# Patient Record
Sex: FEMALE | Race: WHITE | NOT HISPANIC OR LATINO | Employment: FULL TIME | URBAN - NONMETROPOLITAN AREA
[De-identification: names, ages, dates, MRNs, and addresses within clinical notes are randomized per-mention and may not be internally consistent; named-entity substitution may affect disease eponyms.]

---

## 2022-05-19 ENCOUNTER — OFFICE VISIT (OUTPATIENT)
Dept: CARDIOLOGY | Facility: CLINIC | Age: 62
End: 2022-05-19

## 2022-05-19 VITALS
BODY MASS INDEX: 24.95 KG/M2 | DIASTOLIC BLOOD PRESSURE: 74 MMHG | WEIGHT: 135.6 LBS | SYSTOLIC BLOOD PRESSURE: 166 MMHG | HEART RATE: 75 BPM | HEIGHT: 62 IN

## 2022-05-19 DIAGNOSIS — I25.118 CORONARY ARTERY DISEASE OF NATIVE ARTERY OF NATIVE HEART WITH STABLE ANGINA PECTORIS: Primary | ICD-10-CM

## 2022-05-19 DIAGNOSIS — R07.89 CHEST PRESSURE: ICD-10-CM

## 2022-05-19 DIAGNOSIS — R07.9 EXERTIONAL CHEST PAIN: ICD-10-CM

## 2022-05-19 DIAGNOSIS — R06.02 SHORTNESS OF BREATH: ICD-10-CM

## 2022-05-19 DIAGNOSIS — Z87.891 FORMER SMOKER: ICD-10-CM

## 2022-05-19 DIAGNOSIS — I10 PRIMARY HYPERTENSION: ICD-10-CM

## 2022-05-19 DIAGNOSIS — R42 EPISODIC LIGHTHEADEDNESS: ICD-10-CM

## 2022-05-19 DIAGNOSIS — I71.40 AAA (ABDOMINAL AORTIC ANEURYSM) WITHOUT RUPTURE: ICD-10-CM

## 2022-05-19 DIAGNOSIS — E78.00 HYPERCHOLESTEREMIA: ICD-10-CM

## 2022-05-19 DIAGNOSIS — J43.9 PULMONARY EMPHYSEMA, UNSPECIFIED EMPHYSEMA TYPE: ICD-10-CM

## 2022-05-19 DIAGNOSIS — R09.89 BRUIT OF LEFT CAROTID ARTERY: ICD-10-CM

## 2022-05-19 DIAGNOSIS — R00.2 PALPITATIONS: ICD-10-CM

## 2022-05-19 PROBLEM — F17.210 CIGARETTE SMOKER: Status: ACTIVE | Noted: 2022-05-19

## 2022-05-19 PROCEDURE — 99204 OFFICE O/P NEW MOD 45 MIN: CPT | Performed by: NURSE PRACTITIONER

## 2022-05-19 PROCEDURE — 93000 ELECTROCARDIOGRAM COMPLETE: CPT | Performed by: NURSE PRACTITIONER

## 2022-05-19 RX ORDER — IBUPROFEN 800 MG/1
800 TABLET ORAL AS NEEDED
COMMUNITY

## 2022-05-19 RX ORDER — IPRATROPIUM/ALBUTEROL SULFATE 20-100 MCG
1 MIST INHALER (GRAM) INHALATION 4 TIMES DAILY PRN
COMMUNITY

## 2022-05-19 RX ORDER — ATORVASTATIN CALCIUM 20 MG/1
20 TABLET, FILM COATED ORAL DAILY
COMMUNITY

## 2022-05-19 RX ORDER — CHLORAL HYDRATE 500 MG
1000 CAPSULE ORAL
COMMUNITY
End: 2022-11-29 | Stop reason: SINTOL

## 2022-05-19 RX ORDER — SIMETHICONE 80 MG
80 TABLET,CHEWABLE ORAL 3 TIMES DAILY PRN
COMMUNITY

## 2022-05-19 RX ORDER — LISINOPRIL 10 MG/1
10 TABLET ORAL DAILY
Qty: 30 TABLET | Refills: 11 | Status: SHIPPED | OUTPATIENT
Start: 2022-05-19 | End: 2022-11-29 | Stop reason: SINTOL

## 2022-05-19 NOTE — PROGRESS NOTES
Subjective   Aaliyah Luther is a 61 y.o. female seen in the office today for initial cardiac evaluation.  Recently she has had symptoms of chest pain, palpitations, shortness of breath, headaches, and increased urinary output.  Last week while at work she was talking with her supervisor and developed significant midsternal chest pain associated with mild nausea and diaphoresis.  She sat down and rested and symptoms eased.  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  Randomly she experiences pounding type palpitations.  Because of her increased shortness of breath she has recently stopped smoking, about 2 and half weeks ago.  About a year ago she underwent colonoscopy due to GI symptoms which have been persistent.  Recent CT scan of the abdomen was abnormal and according to patient she has been referred to surgeon.  Secondary to cardiac symptoms in the past she underwent Lexiscan stress test and echocardiogram in 2021 with results showing borderline LVH with normal LVEF and no ischemia.  Recent test include: CT scan of chest done 4/12/2022 showed coronary artery calcification compatible with coronary artery disease and calcifications of the ostia of the left subclavian artery; and 4/15/2022 CT of the abdomen with and without contrast infrarenal abdominal aorta shows dilation of 2.5 cm      Chief Complaint   Patient presents with   • Establish Care     Referred per PCP for CAD. Had  Consult with GI earlier this week   • Coronary Artery Disease     Has new diagnosis of COPD   • LABS     Had labs and CT scan of abdomen . Has copy of results today.  Had labs per PCP , I will call for results   • Chest Pain     Has frequent  episodes of  sharp chest pain and SOA    • Hypertension     Reports BP is usually elevated  at most times       Initial cardiac evaluation; chest pain, shortness of breath, palpitations,  lightheadedness        Cardiac History  Past Surgical History:   Procedure Laterality Date   • CARDIOVASCULAR STRESS TEST  2021    [facility]- Dr. Sadler- normal EF, wall motion and perfusion   • [facility], EF55-60%, borderline LVH, trace to mild MR       Current Outpatient Medications   Medication Sig Dispense Refill   • atorvastatin (LIPITOR) 20 MG tablet Take 20 mg by mouth Daily.     • ibuprofen (ADVIL,MOTRIN) 800 MG tablet Take 800 mg by mouth As Needed for Mild Pain .     • ipratropium-albuterol (Combivent Respimat)  MCG/ACT inhaler Inhale 1 puff 4 (Four) Times a Day As Needed for Wheezing.     • metoprolol tartrate (LOPRESSOR) 25 MG tablet Take 25 mg by mouth 2 (Two) Times a Day.     • Omega-3 Fatty Acids (fish oil) 1000 MG capsule capsule Take 1,000 mg by mouth Daily With Breakfast.     • simethicone (MYLICON) 80 MG chewable tablet Chew 80 mg 3 (Three) Times a Day As Needed for Flatulence.     • lisinopril (PRINIVIL,ZESTRIL) 10 MG tablet Take 1 tablet by mouth Daily. 30 tablet 11     No current facility-administered medications for this visit.       Patient has no known allergies.    Past Medical History:   Diagnosis Date   • Aneurysm (HCC)    • Cervical cancer (HCC)    • COPD (chronic obstructive pulmonary disease) (HCC)    • H/O: hysterectomy    • Hyperlipidemia    • Hypertension        Social History     Socioeconomic History   • Marital status:    Tobacco Use   • Smoking status: Former Smoker     Packs/day: 1.00     Years: 42.00     Pack years: 42.00     Quit date: 5/3/2022     Years since quittin.0   • Smokeless tobacco: Never Used   Vaping Use   • Vaping Use: Never used   Substance and Sexual Activity   • Alcohol use: Never   • Drug use: Yes     Types: Marijuana       Family History   Problem Relation Age of Onset   • No Known Problems Mother    • No Known Problems Father    • Diabetes Sister    • Heart attack Sister    •  "Heart attack Brother    • Diabetes Brother    • Heart attack Maternal Grandfather    • Heart attack Paternal Grandfather        Review of Systems   Constitutional: Positive for diaphoresis and fatigue.   Eyes: Negative.    Respiratory: Positive for cough, chest tightness, shortness of breath and wheezing.    Cardiovascular: Positive for chest pain and palpitations. Negative for leg swelling.   Gastrointestinal: Positive for abdominal distention, abdominal pain, constipation, diarrhea and nausea. Negative for blood in stool and vomiting.   Endocrine: Positive for polyuria. Negative for polydipsia and polyphagia.   Genitourinary: Positive for frequency and urgency. Negative for difficulty urinating and dysuria.   Musculoskeletal: Positive for arthralgias. Negative for gait problem.   Skin: Negative.    Allergic/Immunologic: Positive for environmental allergies.   Neurological: Positive for dizziness, light-headedness and headaches. Negative for seizures, syncope, speech difficulty and numbness.   Hematological: Negative for adenopathy. Does not bruise/bleed easily.   Psychiatric/Behavioral: The patient is nervous/anxious.        BP Readings from Last 5 Encounters:   05/19/22 166/74       Wt Readings from Last 5 Encounters:   05/19/22 61.5 kg (135 lb 9.6 oz)          Objective      Labs A1c 5.4, troponin less than 6, TSH 0.547, T4 8.5, T3 uptake 25, free thyroxine 2.1, magnesium 2.2, WBC 7.7, RBC 5.06, hemoglobin 15.5, hematocrit 46.5, platelets 210, vitamin D28.7, total cholesterol 279, triglycerides 89, HDL 56, , glucose 92, BUN 13, creatinine 0.72, sodium 142, potassium 4.2, carbon dioxide 24, calcium 9.3, AST 15, ALT 10, GFR 96    /74 (BP Location: Right arm)   Pulse 75   Ht 157.5 cm (62\")   Wt 61.5 kg (135 lb 9.6 oz)   BMI 24.80 kg/m²     Physical Exam  Vitals and nursing note reviewed.   Constitutional:       Appearance: Normal appearance.   HENT:      Head: Normocephalic.      Nose: Nose normal. "      Mouth/Throat:      Mouth: Mucous membranes are moist.   Eyes:      Pupils: Pupils are equal, round, and reactive to light.   Neck:      Vascular: Carotid bruit (? on left) present.   Cardiovascular:      Rate and Rhythm: Normal rate and regular rhythm.      Pulses: Normal pulses.      Heart sounds: Murmur heard.   Pulmonary:      Effort: Pulmonary effort is normal.      Breath sounds: Wheezing present.   Abdominal:      General: There is distension.      Tenderness: There is abdominal tenderness.   Musculoskeletal:         General: No swelling.   Skin:     General: Skin is warm and dry.      Coloration: Skin is not jaundiced or pale.   Neurological:      Mental Status: She is alert and oriented to person, place, and time.      Gait: Gait normal.   Psychiatric:         Mood and Affect: Mood normal.         Behavior: Behavior normal.           ECG 12 Lead    Date/Time: 5/19/2022 10:34 AM  Performed by: Yoli Gagnon APRN  Authorized by: Yoli Gagnon APRN   Comparison: compared with previous ECG from 2/27/2019  Comparison to previous ECG: Previous EKG Sinus tach, 102 bpm              Assessment & Plan     Diagnoses and all orders for this visit:    1. Coronary artery disease of native artery of native heart with stable angina pectoris (HCC) (Primary)  -     Stress Test With Myocardial Perfusion One Day; Future  -     Adult Transthoracic Echo Complete W/ Cont if Necessary Per Protocol; Future    2. Exertional chest pain  -     Stress Test With Myocardial Perfusion One Day; Future  -     Adult Transthoracic Echo Complete W/ Cont if Necessary Per Protocol; Future    3. Chest pressure  -     Stress Test With Myocardial Perfusion One Day; Future  -     Adult Transthoracic Echo Complete W/ Cont if Necessary Per Protocol; Future    4. Palpitations  -     Stress Test With Myocardial Perfusion One Day; Future  -     Adult Transthoracic Echo Complete W/ Cont if Necessary Per Protocol; Future    5. Bruit of left  carotid artery  -     US Carotid Bilateral; Future    6. Episodic lightheadedness  -     US Carotid Bilateral; Future    7. Primary hypertension  -     ECG 12 Lead  -     lisinopril (PRINIVIL,ZESTRIL) 10 MG tablet; Take 1 tablet by mouth Daily.  Dispense: 30 tablet; Refill: 11    8. Hypercholesteremia    9. Shortness of breath  -     ECG 12 Lead  -     Stress Test With Myocardial Perfusion One Day; Future  -     Adult Transthoracic Echo Complete W/ Cont if Necessary Per Protocol; Future    10. Pulmonary emphysema, unspecified emphysema type (HCC)    11. AAA (abdominal aortic aneurysm) without rupture (HCC)    12. Former smoker      EKG today shows sinus rhythm with possible left atrial enlargement.  Her blood pressure is increased.  Recent labs shows normal kidney function and electrolytes.  Recommend adding lisinopril 10 mg daily to current medications.    Recent labs show significant elevation of LDL being 208.  Currently she is on Lipitor.  If repeat labs do not show significant improvement recommend maximum dose statin therapy and consider addition of PCSK9 inhibitors.  She admits to making significant dietary changes which I encouraged her to continue.    Due to recent diagnosis of emphysema she has stopped smoking which I encouraged her on maintenance of smoking cessation for overall health benefits.    In regards to patient's current symptoms cardiac ischemia needs to be assessed.  A nuclear stress test ordered.  She will try walking treadmill but if unable to tolerate then Lexiscan stress test will be done.  Echocardiogram ordered to look at overall cardiac output, valvular structure, PA pressure.    She admits to episodes of lightheadedness and dizziness.  Possible carotid bruit noted on the left.  Carotid ultrasound ordered to further assess for stenosis as causative factor.    Patient anticipates surgical intervention of GI issues.  She plans to see surgeon in the near future.  Cardiac clearance if needed  can be determined once test results available.    A follow-up visit scheduled.  Please call sooner for cardiac concerns.

## 2022-07-07 ENCOUNTER — APPOINTMENT (OUTPATIENT)
Dept: CARDIOLOGY | Facility: HOSPITAL | Age: 62
End: 2022-07-07

## 2022-08-09 ENCOUNTER — TELEPHONE (OUTPATIENT)
Dept: CARDIOLOGY | Facility: CLINIC | Age: 62
End: 2022-08-09

## 2022-08-09 NOTE — TELEPHONE ENCOUNTER
Patient called had ER visit at [facility] ER  8-7-2022 with diagnosis of dizziness .  ER note, CTA head and EKG  Obtained for review.   She said her employer  has restricted her from work until cleared form Cardiology and PCP .  She has scheduled Stress test and Echo 8-

## 2022-08-10 NOTE — TELEPHONE ENCOUNTER
CTA and EKG were normal. Continue with stress and echo as scheduled. If she continues to have symptoms then await till test results to return to work.

## 2022-08-16 ENCOUNTER — HOSPITAL ENCOUNTER (OUTPATIENT)
Dept: CARDIOLOGY | Facility: HOSPITAL | Age: 62
Discharge: HOME | End: 2022-08-16

## 2022-08-16 VITALS — WEIGHT: 135.58 LBS | BODY MASS INDEX: 24.95 KG/M2 | HEIGHT: 62 IN

## 2022-08-16 DIAGNOSIS — R07.89 CHEST PRESSURE: ICD-10-CM

## 2022-08-16 DIAGNOSIS — R07.9 EXERTIONAL CHEST PAIN: ICD-10-CM

## 2022-08-16 DIAGNOSIS — R06.02 SHORTNESS OF BREATH: ICD-10-CM

## 2022-08-16 DIAGNOSIS — I25.118 CORONARY ARTERY DISEASE OF NATIVE ARTERY OF NATIVE HEART WITH STABLE ANGINA PECTORIS: ICD-10-CM

## 2022-08-16 DIAGNOSIS — R09.89 BRUIT OF LEFT CAROTID ARTERY: ICD-10-CM

## 2022-08-16 DIAGNOSIS — R00.2 PALPITATIONS: ICD-10-CM

## 2022-08-16 DIAGNOSIS — R42 EPISODIC LIGHTHEADEDNESS: ICD-10-CM

## 2022-08-16 LAB
AORTIC DIMENSIONLESS INDEX: 0.83 (DI)
BH CV ECHO MEAS - AO MAX PG: 6.3 MMHG
BH CV ECHO MEAS - AO MEAN PG: 3.1 MMHG
BH CV ECHO MEAS - AO ROOT DIAM: 2.7 CM
BH CV ECHO MEAS - AO V2 MAX: 125.6 CM/SEC
BH CV ECHO MEAS - AO V2 VTI: 24.3 CM
BH CV ECHO MEAS - EDV(CUBED): 68.6 ML
BH CV ECHO MEAS - ESV(CUBED): 16.9 ML
BH CV ECHO MEAS - FS: 37.3 %
BH CV ECHO MEAS - IVS/LVPW: 0.92 CM
BH CV ECHO MEAS - IVSD: 0.8 CM
BH CV ECHO MEAS - LA DIMENSION: 2.42 CM
BH CV ECHO MEAS - LAT PEAK E' VEL: 7.5 CM/SEC
BH CV ECHO MEAS - LV MASS(C)D: 101.8 GRAMS
BH CV ECHO MEAS - LV MAX PG: 4.5 MMHG
BH CV ECHO MEAS - LV MEAN PG: 1.82 MMHG
BH CV ECHO MEAS - LV V1 MAX: 105.5 CM/SEC
BH CV ECHO MEAS - LV V1 VTI: 21.9 CM
BH CV ECHO MEAS - LVIDD: 4.1 CM
BH CV ECHO MEAS - LVIDS: 2.6 CM
BH CV ECHO MEAS - LVPWD: 0.86 CM
BH CV ECHO MEAS - MED PEAK E' VEL: 7 CM/SEC
BH CV ECHO MEAS - MV A MAX VEL: 86 CM/SEC
BH CV ECHO MEAS - MV DEC SLOPE: 278.8 CM/SEC2
BH CV ECHO MEAS - MV DEC TIME: 0.29 MSEC
BH CV ECHO MEAS - MV E MAX VEL: 61 CM/SEC
BH CV ECHO MEAS - MV E/A: 0.71
BH CV ECHO MEAS - MV MAX PG: 3.6 MMHG
BH CV ECHO MEAS - MV MEAN PG: 1.43 MMHG
BH CV ECHO MEAS - MV P1/2T: 81.2 MSEC
BH CV ECHO MEAS - MV V2 VTI: 32.3 CM
BH CV ECHO MEAS - MVA(P1/2T): 2.7 CM2
BH CV ECHO MEAS - PA V2 MAX: 91.8 CM/SEC
BH CV ECHO MEAS - PI END-D VEL: 99.5 CM/SEC
BH CV ECHO MEAS - RV MAX PG: 1.92 MMHG
BH CV ECHO MEAS - RV V1 MAX: 69.2 CM/SEC
BH CV ECHO MEAS - RV V1 VTI: 16.6 CM
BH CV ECHO MEAS - RVDD: 2.47 CM
BH CV ECHO MEAS - TAPSE (>1.6): 1.7 CM
BH CV ECHO MEASUREMENTS AVERAGE E/E' RATIO: 8.41
BH CV REST NUCLEAR ISOTOPE DOSE: 10 MCI
BH CV STRESS COMMENTS STAGE 1: NORMAL
BH CV STRESS DOSE REGADENOSON STAGE 1: 0.4
BH CV STRESS DURATION MIN STAGE 1: 0
BH CV STRESS DURATION SEC STAGE 1: 10
BH CV STRESS NUCLEAR ISOTOPE DOSE: 30 MCI
BH CV STRESS PROTOCOL 1: NORMAL
BH CV STRESS RECOVERY BP: NORMAL MMHG
BH CV STRESS RECOVERY HR: 81 BPM
BH CV STRESS STAGE 1: 1
BH CV XLRA - TDI S': 13.8 CM/SEC
BH CV XLRA MEAS LEFT DIST CCA EDV: 39.1 CM/SEC
BH CV XLRA MEAS LEFT DIST CCA PSV: 97.1 CM/SEC
BH CV XLRA MEAS LEFT DIST ICA EDV: -62 CM/SEC
BH CV XLRA MEAS LEFT DIST ICA PSV: -150.2 CM/SEC
BH CV XLRA MEAS LEFT ICA/CCA RATIO: -1.55
BH CV XLRA MEAS LEFT MID ICA EDV: -67.2 CM/SEC
BH CV XLRA MEAS LEFT MID ICA PSV: -146.7 CM/SEC
BH CV XLRA MEAS LEFT PROX CCA EDV: 39.8 CM/SEC
BH CV XLRA MEAS LEFT PROX CCA PSV: 125.7 CM/SEC
BH CV XLRA MEAS LEFT PROX ECA EDV: -14.5 CM/SEC
BH CV XLRA MEAS LEFT PROX ECA PSV: -80.9 CM/SEC
BH CV XLRA MEAS LEFT PROX ICA EDV: -41.4 CM/SEC
BH CV XLRA MEAS LEFT PROX ICA PSV: -113.6 CM/SEC
BH CV XLRA MEAS LEFT VERTEBRAL A EDV: -19.6 CM/SEC
BH CV XLRA MEAS LEFT VERTEBRAL A PSV: -48.1 CM/SEC
BH CV XLRA MEAS RIGHT DIST CCA EDV: -30.6 CM/SEC
BH CV XLRA MEAS RIGHT DIST CCA PSV: -94.3 CM/SEC
BH CV XLRA MEAS RIGHT DIST ICA EDV: -62.1 CM/SEC
BH CV XLRA MEAS RIGHT DIST ICA PSV: -142.2 CM/SEC
BH CV XLRA MEAS RIGHT ICA/CCA RATIO: 1.51
BH CV XLRA MEAS RIGHT MID ICA EDV: -55 CM/SEC
BH CV XLRA MEAS RIGHT MID ICA PSV: -128.1 CM/SEC
BH CV XLRA MEAS RIGHT PROX CCA EDV: 37.7 CM/SEC
BH CV XLRA MEAS RIGHT PROX CCA PSV: 108.4 CM/SEC
BH CV XLRA MEAS RIGHT PROX ECA EDV: -10.6 CM/SEC
BH CV XLRA MEAS RIGHT PROX ECA PSV: -57 CM/SEC
BH CV XLRA MEAS RIGHT PROX ICA EDV: -44.2 CM/SEC
BH CV XLRA MEAS RIGHT PROX ICA PSV: -94.8 CM/SEC
BH CV XLRA MEAS RIGHT VERTEBRAL A EDV: 18.3 CM/SEC
BH CV XLRA MEAS RIGHT VERTEBRAL A PSV: 47.6 CM/SEC
IVRT: 145 MSEC
LV EF NUC BP: 67 %
MAXIMAL PREDICTED HEART RATE: 161 BPM
MAXIMAL PREDICTED HEART RATE: 161 BPM
PERCENT MAX PREDICTED HR: 59.01 %
SINUS: 2.7 CM
STRESS BASELINE BP: NORMAL MMHG
STRESS BASELINE HR: 74 BPM
STRESS PERCENT HR: 69 %
STRESS POST PEAK BP: NORMAL MMHG
STRESS POST PEAK HR: 95 BPM
STRESS TARGET HR: 137 BPM
STRESS TARGET HR: 137 BPM

## 2022-08-16 PROCEDURE — 93306 TTE W/DOPPLER COMPLETE: CPT | Performed by: INTERNAL MEDICINE

## 2022-08-16 PROCEDURE — 93017 CV STRESS TEST TRACING ONLY: CPT

## 2022-08-16 PROCEDURE — 93880 EXTRACRANIAL BILAT STUDY: CPT | Performed by: RADIOLOGY

## 2022-08-16 PROCEDURE — 78452 HT MUSCLE IMAGE SPECT MULT: CPT | Performed by: INTERNAL MEDICINE

## 2022-08-16 PROCEDURE — A9500 TC99M SESTAMIBI: HCPCS | Performed by: INTERNAL MEDICINE

## 2022-08-16 PROCEDURE — 93880 EXTRACRANIAL BILAT STUDY: CPT

## 2022-08-16 PROCEDURE — 25010000002 REGADENOSON 0.4 MG/5ML SOLUTION: Performed by: INTERNAL MEDICINE

## 2022-08-16 PROCEDURE — 0 TECHNETIUM SESTAMIBI: Performed by: INTERNAL MEDICINE

## 2022-08-16 PROCEDURE — 93018 CV STRESS TEST I&R ONLY: CPT | Performed by: INTERNAL MEDICINE

## 2022-08-16 PROCEDURE — 93306 TTE W/DOPPLER COMPLETE: CPT

## 2022-08-16 PROCEDURE — 78452 HT MUSCLE IMAGE SPECT MULT: CPT

## 2022-08-16 PROCEDURE — 25010000002 AMINOPHYLLINE PER 250 MG: Performed by: INTERNAL MEDICINE

## 2022-08-16 RX ORDER — AMINOPHYLLINE DIHYDRATE 25 MG/ML
125 INJECTION, SOLUTION INTRAVENOUS
Status: COMPLETED | OUTPATIENT
Start: 2022-08-16 | End: 2022-08-16

## 2022-08-16 RX ADMIN — REGADENOSON 0.4 MG: 0.08 INJECTION, SOLUTION INTRAVENOUS at 11:23

## 2022-08-16 RX ADMIN — TECHNETIUM TC 99M SESTAMIBI 1 DOSE: 1 INJECTION INTRAVENOUS at 11:24

## 2022-08-16 RX ADMIN — TECHNETIUM TC 99M SESTAMIBI 1 DOSE: 1 INJECTION INTRAVENOUS at 09:19

## 2022-08-16 RX ADMIN — AMINOPHYLLINE 125 MG: 25 INJECTION, SOLUTION INTRAVENOUS at 11:24

## 2022-09-22 ENCOUNTER — TELEPHONE (OUTPATIENT)
Dept: CARDIOLOGY | Facility: CLINIC | Age: 62
End: 2022-09-22

## 2022-10-20 ENCOUNTER — TELEPHONE (OUTPATIENT)
Dept: CARDIOLOGY | Facility: CLINIC | Age: 62
End: 2022-10-20

## 2022-10-20 NOTE — TELEPHONE ENCOUNTER
Received call from PCP office , patient had been at their office yesterday . She was experiencing dizziness, palpitations and hypotension BP was 70/44 and repeat reading was 112/64. Patient was told to go to [facility]/ER  Patient was unable to have cardiac testing done when scheduled July 2022.

## 2022-10-20 NOTE — TELEPHONE ENCOUNTER
Clarification - Patient went to [facility] /ER yesterday and was recommended to follow up with PCP and Cardiologists . Her medications are Lisinopril 10 mg daily and Metoprolol 25 daily.

## 2022-10-20 NOTE — TELEPHONE ENCOUNTER
Receive phone message  from Leda at [facility] , I returned phone call leaving voicemail .

## 2022-10-21 DIAGNOSIS — R00.2 PALPITATIONS: Primary | ICD-10-CM

## 2022-10-21 NOTE — TELEPHONE ENCOUNTER
Patient aware of ordered cardiac monitor to be mailed to home address  and recommendations to resume Lisinopril if BP increases

## 2022-10-21 NOTE — TELEPHONE ENCOUNTER
I spoke with patient and she has not taken Metoprolol or Lisinopril since Sunday d/t low BP.  Her BP  today without meds is 133/72   HR 88 . She is still fatigued ,has dizziness and palpitations.

## 2022-11-29 ENCOUNTER — OFFICE VISIT (OUTPATIENT)
Dept: CARDIOLOGY | Facility: CLINIC | Age: 62
End: 2022-11-29

## 2022-11-29 VITALS
SYSTOLIC BLOOD PRESSURE: 130 MMHG | BODY MASS INDEX: 24.66 KG/M2 | WEIGHT: 134 LBS | HEIGHT: 62 IN | HEART RATE: 78 BPM | DIASTOLIC BLOOD PRESSURE: 80 MMHG

## 2022-11-29 DIAGNOSIS — E78.00 HYPERCHOLESTEREMIA: ICD-10-CM

## 2022-11-29 DIAGNOSIS — J43.9 PULMONARY EMPHYSEMA, UNSPECIFIED EMPHYSEMA TYPE: ICD-10-CM

## 2022-11-29 DIAGNOSIS — F41.9 ANXIETY: ICD-10-CM

## 2022-11-29 DIAGNOSIS — F17.210 CIGARETTE SMOKER: ICD-10-CM

## 2022-11-29 DIAGNOSIS — I10 PRIMARY HYPERTENSION: ICD-10-CM

## 2022-11-29 DIAGNOSIS — I65.23 BILATERAL CAROTID ARTERY STENOSIS: Primary | ICD-10-CM

## 2022-11-29 DIAGNOSIS — R13.10 DYSPHAGIA, UNSPECIFIED TYPE: ICD-10-CM

## 2022-11-29 PROCEDURE — 99214 OFFICE O/P EST MOD 30 MIN: CPT | Performed by: NURSE PRACTITIONER

## 2022-11-29 RX ORDER — DOCUSATE SODIUM 100 MG/1
100 CAPSULE, LIQUID FILLED ORAL 2 TIMES DAILY
COMMUNITY

## 2022-11-29 NOTE — PROGRESS NOTES
Chief Complaint   Patient presents with   • Follow-up     Cardiac management   • LABS and TESTING     Had labs  on chart   Had Stress test  and Echo August 2022  Has worn Holter monitor  and will mail in today.   • Hypertension     Reports her BP has been running low and she has stopped Metoprolol and lisinopril  d/t side effects . She does monitor BP and HR frequently .  She has occasional palpitation  but nothing worse than normal       Subjective       Aaliyah Luther is a 62 y.o. female seen in May 2022 for initial cardiac consultation.  She admitted to chest pain, palpitations, shortness of breath, headaches and increased urinary output. CT scan of the abdomen was abnormal and according to patient she has been referred to surgeon.  Secondary to cardiac symptoms in the past she underwent Lexiscan stress test and echocardiogram in 2021 with results showing borderline LVH with normal LVEF and no ischemia.  CT scan of chest done 4/12/2022 showed coronary artery calcification compatible with coronary artery disease and calcifications of the ostia of the left subclavian artery; and 4/15/2022 CT of the abdomen with and without contrast infrarenal abdominal aorta shows dilation of 2.5 cm.    On 8/16/2022 carotid ultrasound showed bilateral plaque with moderate stenosis. Echocardiogram showed borderline LVH with no significant valvular issues. Lexiscan stress test showed normal myocardial perfusion without evidence of ischemia.  Later cardiac monitor was ordered but has not yet been mailed back to company for download of recordings.    Today she returns to the office for a follow-up visit.  She denies recurrence of hypotension since stopping Lopressor and lisinopril.  She has been monitoring her blood pressure at home and most often normal with systolic around 130 and diastolic 80s.  Chest pain and palpitations denied.  She does have difficulty with swallowing which is not a new symptom and has had recent upper GI  "with \"good report\".  For cholesterol management she remains on Lipitor without problems noted.  Unfortunately she continues to smoke but tries to keep it \"at a minimum\".    Cardiac History:    Past Surgical History:   Procedure Laterality Date   • CARDIOVASCULAR STRESS TEST  2021    [facility]- Dr. Sadler- normal EF, wall motion and perfusion   • CARDIOVASCULAR STRESS TEST  2022    Lexiscan- EF 67%. 165/62. Negative   • ECHO - CONVERTED  2021    [facility]- Dr. Sadler- sinus rhythm, EF55-60%, borderline LVH, trace to mild MR   • ECHO - CONVERTED  2022    [facility]. EF 65%. Trace-Mild MR       Current Outpatient Medications   Medication Sig Dispense Refill   • atorvastatin (LIPITOR) 20 MG tablet Take 20 mg by mouth Daily.     • docusate sodium (COLACE) 100 MG capsule Take 100 mg by mouth 2 (Two) Times a Day.     • ibuprofen (ADVIL,MOTRIN) 800 MG tablet Take 800 mg by mouth As Needed for Mild Pain .     • ipratropium-albuterol (Combivent Respimat)  MCG/ACT inhaler Inhale 1 puff 4 (Four) Times a Day As Needed for Wheezing.     • simethicone (MYLICON) 80 MG chewable tablet Chew 80 mg 3 (Three) Times a Day As Needed for Flatulence.       No current facility-administered medications for this visit.       Patient has no known allergies.    Past Medical History:   Diagnosis Date   • Aneurysm (HCC)    • Cervical cancer (HCC)    • COPD (chronic obstructive pulmonary disease) ([facility])    • H/O: hysterectomy    • Hyperlipidemia    • Hypertension        Social History     Socioeconomic History   • Marital status:    Tobacco Use   • Smoking status: Some Days     Packs/day: 0.25     Years: 42.00     Pack years: 10.50     Types: Cigarettes     Last attempt to quit: 5/3/2022     Years since quittin.5   • Smokeless tobacco: Never   Vaping Use   • Vaping Use: Never used   Substance and Sexual Activity   • Alcohol use: Never   • Drug use: Yes     Types: Marijuana       Family History   Problem Relation Age " "of Onset   • No Known Problems Mother    • No Known Problems Father    • Diabetes Sister    • Heart attack Sister    • Heart attack Brother    • Diabetes Brother    • Heart attack Maternal Grandfather    • Heart attack Paternal Grandfather        Review of Systems   Constitutional: Negative for decreased appetite, diaphoresis and malaise/fatigue.   HENT: Positive for hoarse voice. Negative for nosebleeds.    Eyes: Negative for blurred vision.   Cardiovascular: Negative for chest pain, claudication, cyanosis, dyspnea on exertion, irregular heartbeat, leg swelling, near-syncope, orthopnea, palpitations, paroxysmal nocturnal dyspnea and syncope.   Respiratory: Positive for cough and shortness of breath.    Endocrine: Negative for cold intolerance and heat intolerance.   Hematologic/Lymphatic: Negative for bleeding problem. Does not bruise/bleed easily.   Skin: Negative for rash.   Musculoskeletal: Negative for myalgias.   Gastrointestinal: Positive for bloating and dysphagia. Negative for heartburn, melena and nausea.   Genitourinary: Negative for dysuria and hematuria.   Neurological: Negative for dizziness, light-headedness and loss of balance.   Psychiatric/Behavioral: Positive for depression. Negative for memory loss and suicidal ideas. The patient is nervous/anxious.         BP Readings from Last 5 Encounters:   11/29/22 130/80   05/19/22 166/74       Wt Readings from Last 5 Encounters:   11/29/22 60.8 kg (134 lb)   08/16/22 61.5 kg (135 lb 9.3 oz)   05/19/22 61.5 kg (135 lb 9.6 oz)       Objective     /80 (BP Location: Left arm, Patient Position: Sitting)   Pulse 78   Ht 157.5 cm (62\")   Wt 60.8 kg (134 lb)   BMI 24.51 kg/m²     Vitals and nursing note reviewed.   Constitutional:       Appearance: Healthy appearance. Not in distress.   Eyes:      Conjunctiva/sclera: Conjunctivae normal.      Pupils: Pupils are equal, round, and reactive to light.   HENT:      Head: Normocephalic.   Neck:      Vascular: " No carotid bruit (not obvious) or JVD.   Pulmonary:      Effort: Pulmonary effort is normal.      Breath sounds: Examination of the right-upper field reveals wheezing. Examination of the left-upper field reveals wheezing. Wheezing present. No rhonchi. No rales.   Cardiovascular:      PMI at left midclavicular line. Normal rate. Regular rhythm.      Murmurs: There is no murmur.   Abdominal:      General: Bowel sounds are normal.      Palpations: Abdomen is soft.   Musculoskeletal: Normal range of motion.      Cervical back: Normal range of motion and neck supple. Skin:     General: Skin is warm and dry.   Neurological:      Mental Status: Alert, oriented to person, place, and time and oriented to person, place and time.          Procedures: none today          Assessment & Plan   Diagnoses and all orders for this visit:    1. Bilateral carotid artery stenosis (Primary)    2. Hypercholesteremia    3. Primary hypertension    4. Pulmonary emphysema, unspecified emphysema type (HCC)    5. Cigarette smoker    6. Dysphagia, unspecified type    7. Anxiety      The reports of her recent echocardiogram, stress test, and carotid ultrasound were reviewed.  Patient denies recent chest pain or palpitations.  Due to hypotension she stopped antihypertensive agents and blood pressure has remained stable.  Patient admits stress contributes to elevations.  Currently she is trying to better manage work and personal stress.  In regards to moderate carotid artery stenosis we will plan repeat carotid ultrasound next visit.  We discussed treatment including smoking cessation and statin therapy.  An informational handout on TIAs was provided.  Patient will follow with you for lab orders.  Please forward copy of next lab results.  Patient also admits to GI symptoms including difficulty swallowing, being easy to choke at times.  She will further discuss symptoms with you and recommendations regarding follow-up with GI.  She admits with  current medication management heartburn symptoms have improved.    Further recommendations when Holter monitor results available.    A 6-month follow-up visit scheduled.  Please call sooner for cardiac concerns.

## 2023-12-12 ENCOUNTER — TELEPHONE (OUTPATIENT)
Dept: CARDIOLOGY | Facility: CLINIC | Age: 63
End: 2023-12-12
Payer: COMMERCIAL

## 2023-12-12 NOTE — TELEPHONE ENCOUNTER
Patient warm transfer from [facility] due to TIA symptoms, falls, pain at base of neck.  I advised patient she needs to seek medical attention and go to ER.  She said her son could come get her in a couple of hours.  I advised patient to call 911.  She verbalized understanding.

## 2024-08-28 ENCOUNTER — TELEPHONE (OUTPATIENT)
Dept: CARDIOLOGY | Facility: CLINIC | Age: 64
End: 2024-08-28

## 2024-09-11 ENCOUNTER — TELEPHONE (OUTPATIENT)
Dept: CARDIOLOGY | Facility: CLINIC | Age: 64
End: 2024-09-11

## 2024-09-11 ENCOUNTER — OFFICE VISIT (OUTPATIENT)
Dept: CARDIOLOGY | Facility: CLINIC | Age: 64
End: 2024-09-11
Payer: COMMERCIAL

## 2024-09-11 VITALS
HEART RATE: 68 BPM | DIASTOLIC BLOOD PRESSURE: 70 MMHG | BODY MASS INDEX: 25.76 KG/M2 | WEIGHT: 140 LBS | OXYGEN SATURATION: 97 % | SYSTOLIC BLOOD PRESSURE: 120 MMHG | HEIGHT: 62 IN

## 2024-09-11 DIAGNOSIS — E78.00 HYPERCHOLESTEREMIA: ICD-10-CM

## 2024-09-11 DIAGNOSIS — R00.2 PALPITATIONS: Primary | ICD-10-CM

## 2024-09-11 DIAGNOSIS — I71.43 INFRARENAL ABDOMINAL AORTIC ANEURYSM (AAA) WITHOUT RUPTURE: ICD-10-CM

## 2024-09-11 DIAGNOSIS — I10 PRIMARY HYPERTENSION: ICD-10-CM

## 2024-09-11 DIAGNOSIS — Z86.718 HISTORY OF DVT (DEEP VEIN THROMBOSIS): ICD-10-CM

## 2024-09-11 DIAGNOSIS — R42 DIZZINESS: ICD-10-CM

## 2024-09-11 DIAGNOSIS — F17.200 SMOKING: ICD-10-CM

## 2024-09-11 DIAGNOSIS — E66.3 OVERWEIGHT: ICD-10-CM

## 2024-09-11 DIAGNOSIS — J43.8 OTHER EMPHYSEMA: ICD-10-CM

## 2024-09-11 PROCEDURE — 99214 OFFICE O/P EST MOD 30 MIN: CPT | Performed by: NURSE PRACTITIONER

## 2024-09-11 RX ORDER — ERGOCALCIFEROL 1.25 MG/1
CAPSULE, LIQUID FILLED ORAL
COMMUNITY
Start: 2024-08-16

## 2024-09-11 RX ORDER — CLONAZEPAM 0.5 MG/1
TABLET ORAL
COMMUNITY
Start: 2024-07-03

## 2024-09-11 RX ORDER — OLMESARTAN MEDOXOMIL 20 MG/1
1 TABLET ORAL DAILY
COMMUNITY
Start: 2024-08-13 | End: 2024-09-12

## 2024-09-11 RX ORDER — METOPROLOL TARTRATE 25 MG/1
TABLET, FILM COATED ORAL EVERY 12 HOURS SCHEDULED
COMMUNITY

## 2024-09-11 NOTE — PROGRESS NOTES
Chief Complaint   Patient presents with    Follow-up     Continual care for cardiac management - last seen 2022    Enlarged aorta ( stomach ) found by [facility] - has not been addressed by a doctor yet and has been admitted at [facility] ER - records being requested     Patient also states DVT in DVT is being followed by Vascular     Labs     2022    medication     Per chart we do not order any medications for her     Dizziness     Per Vascular - has a carotid ultrasound for 9/12/2024     Palpitations     Along with some chest pain on and off at times that has been going on the last few months        Cardiac Complaints  Dizziness and palpitations      Subjective   Aaliyah Luther is a 64 y.o. female with HTN, hyperlipidemia, carotid stenosis, AAA, and history of DVT. She was first referred in 2021.  she underwent Lexiscan stress test and echocardiogram in 2021 with results showing borderline LVH with normal LVEF and no ischemia.  CT scan of chest done 4/12/2022 showed coronary artery calcification compatible with coronary artery disease and calcifications of the ostia of the left subclavian artery; and 4/15/2022 CT of the abdomen with and without contrast infrarenal abdominal aorta shows dilation of 2.5 cm. On 8/16/2022 carotid ultrasound showed bilateral plaque with moderate stenosis.Echocardiogram showed borderline LVH with no significant valvular issues. Lexiscan stress test showed normal myocardial perfusion without evidence of ischemia. She called in 2024 with DVT, had been started on eliquis, appointment made. It appears she went to the hospital for abdominal and back pain, CT showed partial bowel obstruction. CT of the abdomen showed very small infrarenal aneurysm and moderate right renal atrophy.  Labs showed: HH 15.6/45.3, Trop I 4, Na 143, K 3.8, BUN 8, Creatinine 0.73, GFR 94, Lipase 44, Lactic Acid 0.6, Mag 2.0.    She comes today for follow up with complaints of dizziness. Falls and syncope are denied.  She does admit she will have carotid US with vascular tomorrow. She does admit to sharp chest pain that will come at random, most often with palpitations. She was seen in ER at [facility] for the same, cardiac concerns ruled out. She does admit that she has an appointment with Tony again soon for vascular, she thinks he is aware of aneurysm, but would like report. She unfortunately is still smoking despite concerns. Taking eliquis therapy for DVT per vascular, bleed and bruise denied. Reports concerns over afib.            Cardiac History  Past Surgical History:   Procedure Laterality Date    CARDIOVASCULAR STRESS TEST  07/16/2021    [facility]- Dr. Sadler- normal EF, wall motion and perfusion    CARDIOVASCULAR STRESS TEST  08/16/2022    Lexiscan- EF 67%. 165/62. Negative    ECHO - CONVERTED  07/16/2021    [facility]- Dr. Sadler- sinus rhythm, EF55-60%, borderline LVH, trace to mild MR    ECHO - CONVERTED  08/16/2022    TLS. EF 65%. Trace-Mild MR       Current Outpatient Medications   Medication Sig Dispense Refill    apixaban (Eliquis) 5 MG tablet tablet 2 tablets twice a day for the first week and then 1 tablet twice a day after Orally      atorvastatin (LIPITOR) 20 MG tablet Take 1 tablet by mouth Daily.      clonazePAM (KlonoPIN) 0.5 MG tablet       docusate sodium (COLACE) 100 MG capsule Take 1 capsule by mouth 2 (Two) Times a Day.      ibuprofen (ADVIL,MOTRIN) 800 MG tablet Take 1 tablet by mouth As Needed for Mild Pain.      ipratropium-albuterol (Combivent Respimat)  MCG/ACT inhaler Inhale 1 puff 4 (Four) Times a Day As Needed for Wheezing.      metoprolol tartrate (LOPRESSOR) 25 MG tablet Every 12 (Twelve) Hours.      olmesartan (BENICAR) 20 MG tablet Take 1 tablet by mouth Daily.      vitamin D (ERGOCALCIFEROL) 1.25 MG (28217 UT) capsule capsule        No current facility-administered medications for this visit.       Patient has no known allergies.    Past Medical History:   Diagnosis Date    Aneurysm     Cervical  "cancer     COPD (chronic obstructive pulmonary disease)     DVT (deep venous thrombosis)     left leg    Enlarged aorta     stomach    H/O: hysterectomy     Hyperlipidemia     Hypertension        Social History     Socioeconomic History    Marital status:    Tobacco Use    Smoking status: Some Days     Current packs/day: 0.00     Average packs/day: 0.3 packs/day for 42.0 years (10.5 ttl pk-yrs)     Types: Cigarettes     Start date: 5/3/1980     Last attempt to quit: 5/3/2022     Years since quittin.3    Smokeless tobacco: Never   Vaping Use    Vaping status: Never Used   Substance and Sexual Activity    Alcohol use: Never    Drug use: Yes     Types: Marijuana       Family History   Problem Relation Age of Onset    No Known Problems Mother     No Known Problems Father     Diabetes Sister     Heart attack Sister     Heart attack Brother     Diabetes Brother     Heart attack Maternal Grandfather     Heart attack Paternal Grandfather        Review of Systems   Constitutional: Negative for malaise/fatigue and night sweats.   HENT:  Negative for congestion, ear pain, sore throat and tinnitus.    Cardiovascular:  Positive for palpitations. Negative for chest pain, claudication, dyspnea on exertion, irregular heartbeat, leg swelling, near-syncope, orthopnea and syncope.   Respiratory:  Negative for cough, shortness of breath and wheezing.    Musculoskeletal:  Positive for stiffness. Negative for back pain and joint pain.   Gastrointestinal:  Negative for diarrhea, nausea and vomiting.   Genitourinary:  Negative for dysuria, hesitancy and nocturia.   Neurological:  Positive for dizziness and light-headedness. Negative for headaches.   Psychiatric/Behavioral:  Negative for depression and memory loss. The patient is not nervous/anxious.            Objective     /70 (BP Location: Left arm, Patient Position: Sitting, Cuff Size: Adult)   Pulse 68   Ht 157.5 cm (62\")   Wt 63.5 kg (140 lb)   SpO2 97%   " BMI 25.61 kg/m²     Constitutional:       Appearance: Not in distress.   Eyes:      Pupils: Pupils are equal, round, and reactive to light.   HENT:      Nose: Nose normal.   Pulmonary:      Effort: Pulmonary effort is normal.      Breath sounds: Normal breath sounds.   Cardiovascular:      PMI at left midclavicular line. Normal rate. Regular rhythm.      Murmurs: There is a systolic murmur.   Abdominal:      Palpations: Abdomen is soft.   Musculoskeletal: Normal range of motion.      Cervical back: Normal range of motion and neck supple. Skin:     General: Skin is warm and dry.   Neurological:      Mental Status: Alert.         Procedures         Diagnoses and all orders for this visit:    1. Palpitations (Primary)  -     Holter Monitor - 72 Hour Up To 15 Days; Future    2. Dizziness  -     Holter Monitor - 72 Hour Up To 15 Days; Future    3. Primary hypertension    4. Hypercholesteremia    5. Infrarenal abdominal aortic aneurysm (AAA) without rupture    6. History of DVT (deep vein thrombosis)    7. Other emphysema    8. Smoking    9. Overweight             AAA: CT of abdomen showed a 3cm aneurysm of infrarenal aorta, report to be faxed to vascular. Good BP and lipid management urged.    HTN: BP is stable. No change to benicar or lopressor therapy urged.    Palpitations:  Continue BB therapy. Holter not completed 2023, will re-order. Importance of returning monitor advised.     Dizziness: Noted. Has carotid Us with vascular in AM. Advised to follow in regards.     Hyperlipidemia: Taking statin therapy with lipitor. Tolerates well. FLP with your office. Tolerance urged, continue same.    PVD/DVT: Taking eliquis therapy. Followed by vascular. Continue same.    Smoking: Discussed importance of cessation, not ready to quit.    Refills with your office.    BMI noted at 25.61, good cardiac diet urged.    6 month follow up urged, sooner if needed.        Problems Addressed this Visit          Cardiac and Vasculature     Primary hypertension    Relevant Medications    olmesartan (BENICAR) 20 MG tablet    metoprolol tartrate (LOPRESSOR) 25 MG tablet    Hypercholesteremia       Pulmonary and Pneumonias    Pulmonary emphysema     Other Visit Diagnoses       Palpitations    -  Primary    Relevant Orders    Holter Monitor - 72 Hour Up To 15 Days    Dizziness        Relevant Orders    Holter Monitor - 72 Hour Up To 15 Days    Infrarenal abdominal aortic aneurysm (AAA) without rupture        History of DVT (deep vein thrombosis)        Smoking        Overweight              Diagnoses         Codes Comments    Palpitations    -  Primary ICD-10-CM: R00.2  ICD-9-CM: 785.1     Dizziness     ICD-10-CM: R42  ICD-9-CM: 780.4     Primary hypertension     ICD-10-CM: I10  ICD-9-CM: 401.9     Hypercholesteremia     ICD-10-CM: E78.00  ICD-9-CM: 272.0     Infrarenal abdominal aortic aneurysm (AAA) without rupture     ICD-10-CM: I71.43  ICD-9-CM: 441.4     History of DVT (deep vein thrombosis)     ICD-10-CM: Z86.718  ICD-9-CM: V12.51     Other emphysema     ICD-10-CM: J43.8  ICD-9-CM: 492.8     Smoking     ICD-10-CM: F17.200  ICD-9-CM: 305.1     Overweight     ICD-10-CM: E66.3  ICD-9-CM: 278.02                   Aaliyah Luther  reports that she has been smoking cigarettes. She started smoking about 44 years ago. She has a 10.5 pack-year smoking history. She has never used smokeless tobacco. I have educated her on the risk of diseases from using tobacco products.     I advised her to quit and she is not willing to quit.    I spent 3  minutes counseling the patient.                 Electronically signed by Jackie Rojas, IFEANYI September 11, 2024 16:21 EDT

## 2024-09-11 NOTE — TELEPHONE ENCOUNTER
Two week holter advised. Must return. Order is in, please let her know. Also, please send copy of CT of abdomen to Dr. Jimenez (vascular)

## 2024-10-14 DIAGNOSIS — I49.5 SSS (SICK SINUS SYNDROME): Primary | ICD-10-CM

## 2024-10-14 NOTE — TELEPHONE ENCOUNTER
Rx Refill Note  Requested Prescriptions      No prescriptions requested or ordered in this encounter      Last office visit with prescribing clinician: 9/11/2024   Last telemedicine visit with prescribing clinician: Visit date not found   Next office visit with prescribing clinician: 3/19/2025                         Would you like a call back once the refill request has been completed: [] Yes [] No    If the office needs to give you a call back, can they leave a voicemail: [] Yes [] No    Anay Salazar CMA  10/14/24, 11:22 EDT

## 2024-12-04 ENCOUNTER — TELEPHONE (OUTPATIENT)
Dept: CARDIOLOGY | Facility: CLINIC | Age: 64
End: 2024-12-04
Payer: COMMERCIAL

## 2024-12-05 NOTE — TELEPHONE ENCOUNTER
Caller: Aaliyah Luther    Relationship: Self    Best call back number: [phone] (home) [phone] (work)    What is the best time to reach you: ANYTIME    Who are you requesting to speak with (clinical staff, provider,  specific staff member): CLINICAL    What was the call regarding: PT IS ASKING IF THIS OFFICE HAS FINISHED HER DISABILITY PAPERWORK YET. SHE SAYS SHE DROPPED OFF PAPERWORK AROUND 1 WEEK AGO. PLEASE CALL HER WHEN AVAILABLE.       
I spoke with patient.  Patient has not been advised to seek disability from a cardiac standpoint.  I did let patient know Social security has requested records.    
I tried to call patient, no answer, VM is full and unable to leave a VM.    I do not show in chart where patient has been advised to seek disability from a cardiac standpoint.  
yes

## 2025-01-06 ENCOUNTER — TELEPHONE (OUTPATIENT)
Dept: CARDIOLOGY | Facility: CLINIC | Age: 65
End: 2025-01-06
Payer: COMMERCIAL

## 2025-01-06 NOTE — TELEPHONE ENCOUNTER
Caller: Aaliyah Luther    Relationship: Self    Best call back number: [phone]    What form or medical record are you requesting: CT SCANS WITH PROGRESS NOTES    Who is requesting this form or medical record from you: SELF    How would you like to receive the form or medical records (pick-up, mail, fax):     Timeframe paperwork needed: ASAP    Additional notes: PATIENT NEEDS COPY OF CT SCANS AND PROGRESS NOTES FOR . PLEASE ADVISE.

## 2025-01-09 ENCOUNTER — OFFICE VISIT (OUTPATIENT)
Dept: CARDIOLOGY | Facility: CLINIC | Age: 65
End: 2025-01-09
Payer: COMMERCIAL

## 2025-01-09 VITALS
HEIGHT: 62 IN | WEIGHT: 139.2 LBS | SYSTOLIC BLOOD PRESSURE: 122 MMHG | BODY MASS INDEX: 25.62 KG/M2 | DIASTOLIC BLOOD PRESSURE: 74 MMHG | HEART RATE: 72 BPM

## 2025-01-09 DIAGNOSIS — I71.43 INFRARENAL ABDOMINAL AORTIC ANEURYSM (AAA) WITHOUT RUPTURE: ICD-10-CM

## 2025-01-09 DIAGNOSIS — R00.2 PALPITATIONS: ICD-10-CM

## 2025-01-09 DIAGNOSIS — J43.8 OTHER EMPHYSEMA: ICD-10-CM

## 2025-01-09 DIAGNOSIS — I47.10 PAROXYSMAL SVT (SUPRAVENTRICULAR TACHYCARDIA): ICD-10-CM

## 2025-01-09 DIAGNOSIS — G47.34 NOCTURNAL OXYGEN DESATURATION: ICD-10-CM

## 2025-01-09 DIAGNOSIS — R55 SYNCOPE AND COLLAPSE: Primary | ICD-10-CM

## 2025-01-09 DIAGNOSIS — I45.5 SINUS PAUSE: ICD-10-CM

## 2025-01-09 DIAGNOSIS — E78.00 HYPERCHOLESTEREMIA: ICD-10-CM

## 2025-01-09 DIAGNOSIS — R42 DIZZINESS: ICD-10-CM

## 2025-01-09 DIAGNOSIS — I10 PRIMARY HYPERTENSION: ICD-10-CM

## 2025-01-09 PROCEDURE — 1160F RVW MEDS BY RX/DR IN RCRD: CPT | Performed by: NURSE PRACTITIONER

## 2025-01-09 PROCEDURE — 3074F SYST BP LT 130 MM HG: CPT | Performed by: NURSE PRACTITIONER

## 2025-01-09 PROCEDURE — 3078F DIAST BP <80 MM HG: CPT | Performed by: NURSE PRACTITIONER

## 2025-01-09 PROCEDURE — 99214 OFFICE O/P EST MOD 30 MIN: CPT | Performed by: NURSE PRACTITIONER

## 2025-01-09 PROCEDURE — 1159F MED LIST DOCD IN RCRD: CPT | Performed by: NURSE PRACTITIONER

## 2025-01-09 RX ORDER — OLMESARTAN MEDOXOMIL 20 MG/1
20 TABLET ORAL DAILY
COMMUNITY

## 2025-01-09 RX ORDER — TRAMADOL HYDROCHLORIDE 50 MG/1
50 TABLET ORAL EVERY 6 HOURS PRN
COMMUNITY

## 2025-01-09 NOTE — PROGRESS NOTES
"Chief Complaint   Patient presents with    Follow-up     Cardiac management    Edema     Patient reports she feel she is retaining fluid, she reports is worse in morning time when she gets out bed .      Sleep Apnea     Patient follows with Pulmonology , she has recently started wearing oxygen via [place] .      Palpitations     Patient reports feeling palpitations ,  she said feels like her heart will beat out of her chest .    LABS     Had current labs per PCP    Med Refill     No refills needed today.       Haile Luther is a 64 y.o. female with HTN, hyperlipidemia, carotid stenosis, AAA, and history of DVT.  In 2021 Lexiscan stress test and echocardiogram: Borderline LVH, normal EF and no ischemia.  4/12/2022 CT chest scan: CAD and calcifications ostia of left subclavian.  CT of abdomen infrarenal AAA dilation at 2.5 cm.  Carotid ultrasound bilateral plaque with moderate stenosis.  Repeat echo and Lexiscan stress test unchanged.  In 2024 Eliquis started secondary to DVT.  Due to persistent abdominal and back pain she went to hospital and CT showed partial bowel obstruction.  Very small infrarenal artery and moderate right renal atrophy noted.  10/10/2024: 2-week cardiac monitor showed pauses and beta-blocker dose decreased.    Today she returns to the office for follow-up visit.  She admits to \"blacking out\" while at work in November of last year.  According to patient she was not taking to the emergency department at that time.  She has persistent issues with dizziness.  She has palpitations in form of heart racing.  The symptoms occur at least 3-4 times a week but not necessarily every day.  According to patient she is now using supplemental oxygen at night and this morning her O2 sat was 89% with oxygen use.  Her other concern is severe headache especially when coughing.  No longer on Eliquis, reports DVT with superficial.    Cardiac History:    Past Surgical History:   Procedure Laterality Date "    CARDIOVASCULAR STRESS TEST  2021    [facility]- Dr. Sadler- normal EF, wall motion and perfusion    CARDIOVASCULAR STRESS TEST  2022    Lexiscan- EF 67%. 165/62. Negative    CONVERTED (HISTORICAL) HOLTER  10/10/2024    < 13 Days. AVG 73. . One 5 Beats SVT. Two Pauses. Longest- 3.2 Secs    ECHO - CONVERTED  2021    [facility]- Dr. Sadler- sinus rhythm, EF55-60%, borderline LVH, trace to mild MR    ECHO - CONVERTED  2022    TLS. EF 65%. Trace-Mild MR       Current Outpatient Medications   Medication Sig Dispense Refill    atorvastatin (LIPITOR) 20 MG tablet Take 1 tablet by mouth Daily.      clonazePAM (KlonoPIN) 0.5 MG tablet       ipratropium-albuterol (Combivent Respimat)  MCG/ACT inhaler Inhale 1 puff 4 (Four) Times a Day As Needed for Wheezing.      metoprolol tartrate (LOPRESSOR) 25 MG tablet Take 1 tablet by mouth Daily.      olmesartan (BENICAR) 20 MG tablet Take 1 tablet by mouth Daily.      traMADol (ULTRAM) 50 MG tablet Take 1 tablet by mouth Every 6 (Six) Hours As Needed for Moderate Pain.      vitamin D (ERGOCALCIFEROL) 1.25 MG (05470 UT) capsule capsule       olmesartan (BENICAR) 20 MG tablet Take 1 tablet by mouth Daily.       No current facility-administered medications for this visit.       Patient has no known allergies.    Past Medical History:   Diagnosis Date    Aneurysm     Cervical cancer     COPD (chronic obstructive pulmonary disease)     Coronary artery disease     DVT (deep venous thrombosis)     left leg    Enlarged aorta     stomach    H/O: hysterectomy     Hyperlipidemia     Hypertension     Renal artery aneurysm     Sleep apnea        Social History     Socioeconomic History    Marital status:    Tobacco Use    Smoking status: Some Days     Current packs/day: 0.00     Average packs/day: 0.3 packs/day for 42.0 years (10.5 ttl pk-yrs)     Types: Cigarettes     Start date: 5/3/1980     Last attempt to quit: 5/3/2022     Years since quittin.6  "   Smokeless tobacco: Never   Vaping Use    Vaping status: Never Used   Substance and Sexual Activity    Alcohol use: Never    Drug use: Yes     Types: Marijuana       Family History   Problem Relation Age of Onset    No Known Problems Mother     No Known Problems Father     Diabetes Sister     Heart attack Sister     Heart attack Brother     Diabetes Brother     Heart attack Maternal Grandfather     Heart attack Paternal Grandfather        Review of Systems   Constitutional: Positive for malaise/fatigue. Negative for diaphoresis and fever.   Cardiovascular:  Positive for leg swelling (worse in mornings), near-syncope, palpitations and syncope. Negative for chest pain.   Respiratory:  Positive for shortness of breath and sleep disturbances due to breathing.    Hematologic/Lymphatic: Negative for bleeding problem.   Neurological:  Positive for dizziness, headaches and light-headedness.   Psychiatric/Behavioral:  Positive for memory loss. The patient is nervous/anxious.         BP Readings from Last 5 Encounters:   01/09/25 122/74   09/11/24 120/70   11/29/22 130/80   05/19/22 166/74       Wt Readings from Last 5 Encounters:   01/09/25 63.1 kg (139 lb 3.2 oz)   09/11/24 63.5 kg (140 lb)   11/29/22 60.8 kg (134 lb)   08/16/22 61.5 kg (135 lb 9.3 oz)   05/19/22 61.5 kg (135 lb 9.6 oz)       Objective     /74 (BP Location: Left arm, Patient Position: Sitting, Cuff Size: Adult)   Pulse 72   Ht 157.5 cm (62\")   Wt 63.1 kg (139 lb 3.2 oz)   BMI 25.46 kg/m²     Vitals and nursing note reviewed.   Constitutional:       Appearance: Not in distress.   HENT:      Head: Normocephalic.   Neck:      Vascular: Carotid bruit present.   Pulmonary:      Effort: Pulmonary effort is normal.      Breath sounds: Wheezing present. No rales.   Cardiovascular:      PMI at left midclavicular line. Normal rate. Regular rhythm. loud S2.    Edema:     Ankle: bilateral trace edema of the ankle.  Abdominal:      General: Bowel sounds are " normal.      Palpations: Abdomen is soft.   Musculoskeletal:      Cervical back: Neck supple. Skin:     General: Skin is warm and dry.   Neurological:      Mental Status: Alert, oriented to person, place, and time and oriented to person, place and time.   Psychiatric:         Behavior: Behavior is cooperative.          Procedures: None today         Assessment & Plan   Diagnoses and all orders for this visit:    1. Syncope and collapse (Primary)  -     US Carotid Bilateral; Future  -     Holter Monitor - 72 Hour Up To 15 Days; Future    2. Palpitations  -     Holter Monitor - 72 Hour Up To 15 Days; Future    3. Dizziness  -     US Carotid Bilateral; Future  -     Holter Monitor - 72 Hour Up To 15 Days; Future    4. Sinus pause  -     Holter Monitor - 72 Hour Up To 15 Days; Future    5. Paroxysmal SVT (supraventricular tachycardia)  -     Holter Monitor - 72 Hour Up To 15 Days; Future    6. Primary hypertension    7. Hypercholesteremia    8. Other emphysema    9. Infrarenal abdominal aortic aneurysm (AAA) without rupture    10. Nocturnal oxygen desaturation      Syncope and collapse/palpitations  -October 2024 cardiac monitor 14 days: 2 pauses longest 3.2 seconds, brief episode SVT, no VT  -Repeat 14-day cardiac monitor to validate no significant pauses after decreased dose beta-blocker.    Carotid bruit  -History moderate plaque per ultrasound 2022  -Repeat ultrasound ordered    AAA  -June 2024 3 cm infrarenal aortic aneurysm  -Maintain good BP, lipid management  -Plan to repeat CT scan at follow-up visit in 6 months    Nocturnal oxygen desaturation/emphysema  -Followed by Dr. Mills  -Admits to using supplemental oxygen at night  -Patient had questions regarding CPAP.  Advised to follow-up with Dr. Mills in regards and to update on home oxygen saturation monitoring.    HTN  -BP normal  -Continue current dose Lopressor 25 mg once a day    Hypercholesterolemia  -Labs per PCP  -Continue Lipitor    6-month follow-up  visit scheduled.             Electronically signed by IFEANYI Chopra,  January 9, 2025 17:07 EST    Dictated Utilizing Dragon Dictation: Part of this note may be an electronic transcription/translation of spoken language to printed text using the Dragon Dictation System.

## 2025-01-14 ENCOUNTER — HOSPITAL ENCOUNTER (OUTPATIENT)
Dept: CARDIOLOGY | Facility: HOSPITAL | Age: 65
Discharge: HOME | End: 2025-01-14
Admitting: NURSE PRACTITIONER
Payer: COMMERCIAL

## 2025-01-14 DIAGNOSIS — R55 SYNCOPE AND COLLAPSE: ICD-10-CM

## 2025-01-14 DIAGNOSIS — R42 DIZZINESS: ICD-10-CM

## 2025-01-14 PROCEDURE — 93880 EXTRACRANIAL BILAT STUDY: CPT

## 2025-01-14 PROCEDURE — 93880 EXTRACRANIAL BILAT STUDY: CPT | Performed by: RADIOLOGY

## 2025-01-21 ENCOUNTER — TELEPHONE (OUTPATIENT)
Dept: CARDIOLOGY | Facility: CLINIC | Age: 65
End: 2025-01-21

## 2025-01-21 DIAGNOSIS — Z79.899 MEDICATION MANAGEMENT: ICD-10-CM

## 2025-01-21 DIAGNOSIS — R42 DIZZINESS: Primary | ICD-10-CM

## 2025-01-21 DIAGNOSIS — I65.21 STENOSIS OF RIGHT CAROTID ARTERY: ICD-10-CM

## 2025-01-21 NOTE — TELEPHONE ENCOUNTER
Caller: Aaliyah Luther    Relationship: Self    Best call back number: [phone]      What is the best time to reach you: ANYTIME    Who are you requesting to speak with (clinical staff, provider,  specific staff member): CLINICAL    Do you know the name of the person who called: LOTUS    What was the call regarding: PATIENT WAS CALLING BACK TO SPEAK TO LOTUS FROM A MESSAGE SHE GOT ABOUT DETAILS OF A CAROTID  SCAN PATIENT IS HAVING.  PLEASE CALL BACK.    Is it okay if the provider responds through MyChart: CALL

## 2025-01-24 NOTE — TELEPHONE ENCOUNTER
I spoke with patient and she is aware of scheduled CTA Carotids at [facility]

## 2025-04-01 ENCOUNTER — TELEPHONE (OUTPATIENT)
Dept: CARDIOLOGY | Facility: CLINIC | Age: 65
End: 2025-04-01
Payer: COMMERCIAL

## 2025-04-01 NOTE — TELEPHONE ENCOUNTER
[facility] to have a [facility] pyloroplasty. According to our records, I do not see where patient has had any stenting.          Fax [phone]

## 2025-07-16 ENCOUNTER — OFFICE VISIT (OUTPATIENT)
Dept: CARDIOLOGY | Facility: CLINIC | Age: 65
End: 2025-07-16
Payer: COMMERCIAL

## 2025-07-16 VITALS
HEART RATE: 72 BPM | SYSTOLIC BLOOD PRESSURE: 130 MMHG | BODY MASS INDEX: 25.17 KG/M2 | HEIGHT: 62 IN | DIASTOLIC BLOOD PRESSURE: 70 MMHG | WEIGHT: 136.8 LBS

## 2025-07-16 DIAGNOSIS — I71.43 INFRARENAL ABDOMINAL AORTIC ANEURYSM (AAA) WITHOUT RUPTURE: ICD-10-CM

## 2025-07-16 DIAGNOSIS — R00.2 PALPITATIONS: ICD-10-CM

## 2025-07-16 DIAGNOSIS — F17.210 CIGARETTE SMOKER: ICD-10-CM

## 2025-07-16 DIAGNOSIS — E78.00 HYPERCHOLESTEREMIA: ICD-10-CM

## 2025-07-16 DIAGNOSIS — I65.21 STENOSIS OF RIGHT CAROTID ARTERY: ICD-10-CM

## 2025-07-16 DIAGNOSIS — I10 PRIMARY HYPERTENSION: Primary | ICD-10-CM

## 2025-07-16 PROCEDURE — 3075F SYST BP GE 130 - 139MM HG: CPT | Performed by: NURSE PRACTITIONER

## 2025-07-16 PROCEDURE — 1159F MED LIST DOCD IN RCRD: CPT | Performed by: NURSE PRACTITIONER

## 2025-07-16 PROCEDURE — 1160F RVW MEDS BY RX/DR IN RCRD: CPT | Performed by: NURSE PRACTITIONER

## 2025-07-16 PROCEDURE — 99214 OFFICE O/P EST MOD 30 MIN: CPT | Performed by: NURSE PRACTITIONER

## 2025-07-16 PROCEDURE — 3078F DIAST BP <80 MM HG: CPT | Performed by: NURSE PRACTITIONER

## 2025-07-16 RX ORDER — OLMESARTAN MEDOXOMIL 20 MG/1
20 TABLET ORAL DAILY
COMMUNITY

## 2025-07-16 RX ORDER — IBUPROFEN 600 MG/1
600 TABLET, FILM COATED ORAL EVERY 6 HOURS PRN
COMMUNITY

## 2025-07-16 RX ORDER — EZETIMIBE 10 MG/1
10 TABLET ORAL DAILY
COMMUNITY

## 2025-07-16 RX ORDER — METOCLOPRAMIDE 10 MG/1
10 TABLET ORAL 3 TIMES DAILY
COMMUNITY

## 2025-07-16 NOTE — PROGRESS NOTES
Chief Complaint   Patient presents with    Follow-up     Cardiac management , Patient states she has a random flutter and gets SOA at times.   She has diagnosis of gastroparesis , she was not able to have pyloroplasty     LABS     No current labs , will follow up with PCP soon and have labs     Med Refill     No refills needed today. PCP manages refills       Subjective       Aaliyah Luther is a 64 y.o. female with HTN, hyperlipidemia, carotid stenosis, AAA, and history of DVT.  In 2021 Lexiscan stress test and echocardiogram: Borderline LVH, normal EF and no ischemia.  4/12/2022 CT chest scan: CAD and calcifications ostia of left subclavian.  CT of abdomen infrarenal AAA dilation at 2.5 cm.  Carotid ultrasound bilateral plaque with moderate stenosis.  Repeat echo and Lexiscan stress test unchanged.  In 2024 Eliquis started secondary to DVT.  Due to persistent abdominal and back pain she went to hospital and CT showed partial bowel obstruction.  Very small infrarenal artery and moderate right renal atrophy noted.  10/10/2024: 2-week cardiac monitor showed pauses and beta-blocker dose decreased.  1/14/2025 carotid ultrasound: 50-69% right ICA, no stenosis left. On 1/21/2025 carotid CTA showed some blockage left vertebral and left subclavian with recommendation to consider referral to vascular.  Patient already seeing Dr. Jimenez due to history of DVT.    Today she returns to the office for a follow-up visit.  Patient admits most mornings her blood pressure is slightly elevated but returns to normal after taking Lopressor.  Palpitations remain brief and occur most often in the evenings.  No significant associated symptoms noted.  Shortness of breath is unchanged and followed by pulmonologist.  She reports new diagnosis of gastroparesis.    Cardiac History:    Past Surgical History:   Procedure Laterality Date    CARDIOVASCULAR STRESS TEST  07/16/2021    [facility]- Dr. Sadler- normal EF, wall motion and perfusion     CARDIOVASCULAR STRESS TEST  08/16/2022    Lexiscan- EF 67%. 165/62. Negative    CONVERTED (HISTORICAL) HOLTER  10/10/2024    < 13 Days. AVG 73. . One 5 Beats SVT. Two Pauses. Longest- 3.2 Secs    CONVERTED (HISTORICAL) HOLTER  02/06/2025    12 Days. AVG 70. .2 SVT    ECHO - CONVERTED  07/16/2021    [facility]- Dr. Sadler- sinus rhythm, EF55-60%, borderline LVH, trace to mild MR    ECHO - CONVERTED  08/16/2022    TLS. EF 65%. Trace-Mild MR       Current Outpatient Medications   Medication Sig Dispense Refill    atorvastatin (LIPITOR) 20 MG tablet Take 1 tablet by mouth Daily.      clonazePAM (KlonoPIN) 0.5 MG tablet Take 2 tablets by mouth At Night As Needed for Anxiety.      ezetimibe (ZETIA) 10 MG tablet Take 1 tablet by mouth Daily.      ibuprofen (ADVIL,MOTRIN) 600 MG tablet Take 1 tablet by mouth Every 6 (Six) Hours As Needed for Mild Pain.      ipratropium-albuterol (Combivent Respimat)  MCG/ACT inhaler Inhale 1 puff 4 (Four) Times a Day As Needed for Wheezing.      metoclopramide (REGLAN) 10 MG tablet Take 1 tablet by mouth 3 times a day.      metoprolol tartrate (LOPRESSOR) 25 MG tablet Take 1 tablet by mouth Daily. Take 1/2 in morning and 1/2 at night      olmesartan (BENICAR) 20 MG tablet Take 1 tablet by mouth Daily.      vitamin D (ERGOCALCIFEROL) 1.25 MG (02019 UT) capsule capsule Take 1 capsule by mouth Every 7 (Seven) Days.       No current facility-administered medications for this visit.       Patient has no known allergies.    Past Medical History:   Diagnosis Date    Aneurysm     Cervical cancer 1991    COPD (chronic obstructive pulmonary disease)     Coronary artery disease     DVT (deep venous thrombosis)     left leg    Enlarged aorta     stomach    Gastroparesis 2025    H/O: hysterectomy 2004    Hyperlipidemia     Hypertension     Renal artery aneurysm     Sleep apnea        Social History     Socioeconomic History    Marital status:    Tobacco Use    Smoking status: Some  "Days     Current packs/day: 0.00     Average packs/day: 0.3 packs/day for 42.0 years (10.5 ttl pk-yrs)     Types: Cigarettes     Start date: 5/3/1980     Last attempt to quit: 5/3/2022     Years since quitting: 3.2    Smokeless tobacco: Never   Vaping Use    Vaping status: Never Used   Substance and Sexual Activity    Alcohol use: Never    Drug use: Yes     Types: Marijuana       Family History   Problem Relation Age of Onset    No Known Problems Mother     No Known Problems Father     Diabetes Sister     Heart attack Sister     Heart attack Brother     Diabetes Brother     Heart attack Maternal Grandfather     Heart attack Paternal Grandfather        Review of Systems   Cardiovascular:  Positive for palpitations. Negative for chest pain, leg swelling and near-syncope.   Respiratory:  Positive for shortness of breath.    Gastrointestinal:  Positive for bloating. Negative for melena and nausea.        BP Readings from Last 5 Encounters:   07/16/25 130/70   01/09/25 122/74   09/11/24 120/70   11/29/22 130/80   05/19/22 166/74       Wt Readings from Last 5 Encounters:   07/16/25 62.1 kg (136 lb 12.8 oz)   01/09/25 63.1 kg (139 lb 3.2 oz)   09/11/24 63.5 kg (140 lb)   11/29/22 60.8 kg (134 lb)   08/16/22 61.5 kg (135 lb 9.3 oz)       Objective     /70 (BP Location: Left arm, Patient Position: Sitting, Cuff Size: Adult)   Pulse 72   Ht 157.5 cm (62\")   Wt 62.1 kg (136 lb 12.8 oz)   BMI 25.02 kg/m²     Vitals and nursing note reviewed.   Constitutional:       Appearance: Not in distress.   Eyes:      Conjunctiva/sclera: Conjunctivae normal.      Pupils: Pupils are equal, round, and reactive to light.   HENT:      Head: Normocephalic.   Neck:      Vascular: Carotid bruit present.   Pulmonary:      Effort: Pulmonary effort is normal.      Breath sounds: Normal breath sounds. No rales.   Cardiovascular:      PMI at left midclavicular line. Normal rate. Regular rhythm. loud S2.    Pulses:     Intact distal pulses. " "  Edema:     Peripheral edema absent.   Abdominal:      General: Bowel sounds are normal.      Palpations: Abdomen is soft.   Musculoskeletal:      Cervical back: Neck supple. Skin:     General: Skin is warm and dry.   Neurological:      Mental Status: Alert, oriented to person, place, and time and oriented to person, place and time.          Procedures: None today         Assessment & Plan   Diagnoses and all orders for this visit:    1. Primary hypertension (Primary)    2. Palpitations    3. Infrarenal abdominal aortic aneurysm (AAA) without rupture    4. Hypercholesteremia    5. Cigarette smoker    6. Stenosis of right carotid artery      palpitations  -October 2024 cardiac monitor 14 days: 2 pauses longest 3.2 seconds, brief episode SVT, no VT  -After decrease beta-blocker repeat 12-day cardiac monitor done 1/10/2025 was negative for pauses, avg HR 70 bpm.  -Admits to evening palpitations.  Try changing Lopressor to 1/2 tablet twice daily.     Carotid bruit  -History moderate plaque per ultrasound 2022  - 1/21/2025 CTA done and forwarded to vascular, Dr. Jimenez.  Patient admits angiogram done showing 50% disease on left but developed reaction to \"medication\".  Currently stenosis being monitored.     AAA  -June 2024 3 cm infrarenal aortic aneurysm  -Maintaining good BP, lipid management, maintaining smoking cessation.  -Plan to repeat CT scan at next visit unless done sooner.     Nocturnal oxygen desaturation/emphysema  -Followed by Dr. Mills  -Admits to using supplemental oxygen at night     HTN  -BP normal, HR and rhythm normal  -Continue Lopressor 25 mg and olmesartan 20 mg.     Hypercholesterolemia  -Labs per PCP  -Continue Lipitor     6-month follow-up visit scheduled.               Electronically signed by IFEANYI Chopra,  July 16, 2025 16:03 EDT    Dictated Utilizing Dragon Dictation: Part of this note may be an electronic transcription/translation of spoken language to printed text using the Dragon " Dictation System.